# Patient Record
Sex: FEMALE | URBAN - METROPOLITAN AREA
[De-identification: names, ages, dates, MRNs, and addresses within clinical notes are randomized per-mention and may not be internally consistent; named-entity substitution may affect disease eponyms.]

---

## 2023-11-13 ENCOUNTER — CLAIMS CREATED FROM THE CLAIM WINDOW (OUTPATIENT)
Dept: URBAN - METROPOLITAN AREA CLINIC 54 | Facility: CLINIC | Age: 28
End: 2023-11-13
Payer: SELF-PAY

## 2023-11-13 ENCOUNTER — DASHBOARD ENCOUNTERS (OUTPATIENT)
Age: 28
End: 2023-11-13

## 2023-11-13 ENCOUNTER — LAB OUTSIDE AN ENCOUNTER (OUTPATIENT)
Dept: URBAN - METROPOLITAN AREA CLINIC 54 | Facility: CLINIC | Age: 28
End: 2023-11-13

## 2023-11-13 VITALS
WEIGHT: 145 LBS | HEART RATE: 83 BPM | BODY MASS INDEX: 29.23 KG/M2 | DIASTOLIC BLOOD PRESSURE: 73 MMHG | HEIGHT: 59 IN | SYSTOLIC BLOOD PRESSURE: 112 MMHG | TEMPERATURE: 97.9 F

## 2023-11-13 DIAGNOSIS — R10.11 RUQ PAIN: ICD-10-CM

## 2023-11-13 PROCEDURE — 99204 OFFICE O/P NEW MOD 45 MIN: CPT

## 2023-11-13 NOTE — PHYSICAL EXAM GASTROINTESTINAL
Abdomen , soft, mild RUQ tenderness, negative Hall's sign, nondistended , no rebound guarding or rigidity , no masses palpable , Liver and Spleen , no hepatomegaly present , no hepatosplenomegaly , liver nontender , spleen not palpable

## 2023-11-13 NOTE — PHYSICAL EXAM CHEST:
no lesions, no deformities, no traumatic injuries, breathing is unlabored without accessory muscle use

## 2023-11-13 NOTE — HPI-TODAY'S VISIT:
11/13/23: Pt is a 29 yo female who presents today for RUQ pain x 10 days. Pain is sharp and intermittent. Radiates around to her back. Not necessarily related to eating, but pt is trying to avoid greasy foods. Has nausea in the mornings and had one episode of vomiting. Started omeprazole yesterday for reflux. Took ibuprofen yesterday for pain which helped, otherwise denies frequent NSAIDs. Patient denies any diarrhea, constipation, melena or rectal bleeding. Mother and father had gallbladder disease, both s/p CCY.

## 2023-11-15 ENCOUNTER — TELEPHONE ENCOUNTER (OUTPATIENT)
Dept: URBAN - METROPOLITAN AREA CLINIC 54 | Facility: CLINIC | Age: 28
End: 2023-11-15

## 2023-12-11 ENCOUNTER — OFFICE VISIT (OUTPATIENT)
Dept: URBAN - METROPOLITAN AREA CLINIC 54 | Facility: CLINIC | Age: 28
End: 2023-12-11